# Patient Record
Sex: MALE | Race: WHITE | Employment: FULL TIME | URBAN - METROPOLITAN AREA
[De-identification: names, ages, dates, MRNs, and addresses within clinical notes are randomized per-mention and may not be internally consistent; named-entity substitution may affect disease eponyms.]

---

## 2017-12-21 ENCOUNTER — HOSPITAL ENCOUNTER (OUTPATIENT)
Dept: OTHER | Age: 32
Discharge: HOME | End: 2017-12-21
Attending: UROLOGY | Admitting: UROLOGY

## 2018-08-28 ENCOUNTER — HOSPITAL ENCOUNTER (OUTPATIENT)
Dept: OTHER | Age: 33
Discharge: HOME | End: 2018-08-28
Attending: UROLOGY | Admitting: UROLOGY

## 2025-02-24 ENCOUNTER — OFFICE VISIT (OUTPATIENT)
Dept: SURGERY | Age: 40
End: 2025-02-24
Payer: COMMERCIAL

## 2025-02-24 VITALS
WEIGHT: 158.8 LBS | OXYGEN SATURATION: 98 % | SYSTOLIC BLOOD PRESSURE: 120 MMHG | DIASTOLIC BLOOD PRESSURE: 74 MMHG | HEART RATE: 63 BPM | HEIGHT: 69 IN | BODY MASS INDEX: 23.52 KG/M2

## 2025-02-24 DIAGNOSIS — K61.0 PERIANAL ABSCESS: Primary | ICD-10-CM

## 2025-02-24 PROCEDURE — 99203 OFFICE O/P NEW LOW 30 MIN: CPT | Performed by: NURSE PRACTITIONER

## 2025-02-24 RX ORDER — OXYCODONE AND ACETAMINOPHEN 5; 325 MG/1; MG/1
1 TABLET ORAL EVERY 8 HOURS PRN
COMMUNITY
Start: 2025-02-17

## 2025-02-24 RX ORDER — CIPROFLOXACIN 500 MG/1
500 TABLET, FILM COATED ORAL 2 TIMES DAILY
COMMUNITY
Start: 2025-02-17 | End: 2025-02-24

## 2025-02-24 RX ORDER — METRONIDAZOLE 500 MG/1
500 TABLET ORAL 3 TIMES DAILY
COMMUNITY
Start: 2025-02-17 | End: 2025-02-24

## 2025-02-24 RX ORDER — PSEUDOEPHEDRINE HCL 30 MG
100 TABLET ORAL DAILY PRN
COMMUNITY
Start: 2025-02-17 | End: 2025-02-27

## 2025-02-24 ASSESSMENT — ENCOUNTER SYMPTOMS
SHORTNESS OF BREATH: 0
DIARRHEA: 0
VOMITING: 0
WHEEZING: 0
NAUSEA: 0
ABDOMINAL PAIN: 0
CONSTIPATION: 0
COLOR CHANGE: 0

## 2025-02-24 NOTE — PROGRESS NOTES
General Surgery Consult Note  Margarita Rodriguez CNP    PATIENT: Shaq Abraham, 1985, 39 y.o., male    Reason for Evaluation & Chief Complaint:     Chief Complaint   Patient presents with    Consultation     NP - abscess of buttocks  -seen ED on 2/19/25 and area was lanced and ATBs started       Requesting Provider: No ref. provider found    History Obtained From:  patient, electronic medical record    HISTORY OF PRESENT ILLNESS:    Shaq Abraham is a 39 y.o. male presenting for follow up after I&D of abscess buttocks.  Onset was about 2 weeks ago however pain increased and was unable to walk.  He presented to [facility] on Wednesday and had I&D.   States has been better since draining.      Past Medical History:    Past Medical History:   Diagnosis Date    Acid reflux     2015    Fracture     of toes on both feet by  accident.     H/O Doppler ultrasound 6/07/2015     Carotid duplex-no evidence of stenosis    H/O echocardiogram 6/08/2015    EF 55%, mild MR, Mild MVP    History of exercise stress test 06/30/2015    no ischemia, chest pain or arrythmia    History of Holter monitoring 6/08/2015    predominant rhythm is sinus, MHR was 38 bpm, average was 53 bpm, max was 100 bpm, 1.8 sec pause, sinus augusto with PAC's       Past Surgical History:    Past Surgical History:   Procedure Laterality Date    TONSILLECTOMY         Family History:   History reviewed. No pertinent family history.    REVIEW OF SYSTEMS:    Review of Systems   Constitutional:  Negative for chills and fever.   Respiratory:  Negative for shortness of breath and wheezing.    Cardiovascular:  Negative for chest pain and leg swelling.   Gastrointestinal:  Negative for abdominal pain, constipation, diarrhea, nausea and vomiting.   Skin:  Negative for color change.   Neurological:  Negative for light-headedness.     I have reviewed the patient's information pertinent to this visit, including medical history, family history, social history